# Patient Record
Sex: FEMALE | Race: WHITE | NOT HISPANIC OR LATINO | Employment: OTHER | URBAN - METROPOLITAN AREA
[De-identification: names, ages, dates, MRNs, and addresses within clinical notes are randomized per-mention and may not be internally consistent; named-entity substitution may affect disease eponyms.]

---

## 2019-04-23 ENCOUNTER — TRANSFERRED RECORDS (OUTPATIENT)
Dept: HEALTH INFORMATION MANAGEMENT | Facility: CLINIC | Age: 54
End: 2019-04-23

## 2019-04-23 LAB
ALT SERPL-CCNC: 17 U/L (ref 0–55)
AST SERPL-CCNC: 19 U/L (ref 10–40)
CREAT SERPL-MCNC: 0.84 MG/DL (ref 0.55–1.02)
GFR SERPL CREATININE-BSD FRML MDRD: >60 ML/MIN/1.73M2
GLUCOSE SERPL-MCNC: 97 MG/DL (ref 70–100)
POTASSIUM SERPL-SCNC: 4.4 MMOL/L (ref 3.5–5.1)

## 2019-06-10 ENCOUNTER — TRANSFERRED RECORDS (OUTPATIENT)
Dept: HEALTH INFORMATION MANAGEMENT | Facility: CLINIC | Age: 54
End: 2019-06-10

## 2019-06-18 ENCOUNTER — OFFICE VISIT (OUTPATIENT)
Dept: SURGERY | Facility: CLINIC | Age: 54
End: 2019-06-18
Payer: COMMERCIAL

## 2019-06-18 VITALS
HEIGHT: 63 IN | BODY MASS INDEX: 20.73 KG/M2 | SYSTOLIC BLOOD PRESSURE: 120 MMHG | HEART RATE: 60 BPM | DIASTOLIC BLOOD PRESSURE: 80 MMHG | WEIGHT: 117 LBS

## 2019-06-18 DIAGNOSIS — K80.20 SYMPTOMATIC CHOLELITHIASIS: ICD-10-CM

## 2019-06-18 PROCEDURE — 99204 OFFICE O/P NEW MOD 45 MIN: CPT | Performed by: SURGERY

## 2019-06-18 ASSESSMENT — MIFFLIN-ST. JEOR: SCORE: 1089.84

## 2019-06-19 PROBLEM — K80.20 SYMPTOMATIC CHOLELITHIASIS: Status: ACTIVE | Noted: 2019-06-19

## 2019-06-19 NOTE — PROGRESS NOTES
"Surgery Consultation, [facility]         Richie Wilcox MD    Shaneka Cummings MRN# [id number]   YOB: 1965 Age: 54 year old     PCP:  Justine Espino [phone]    Chief Complaint:  Right upper quadrant pain, nausea    History of Present Illness:  Shaneka Cummings is a 54 year old female who presented with several episodes of upper abdominal pain and intermittent nausea, usually after eating.  Commonly associated with eating greasy foods.  Episodes last a particularly long time, usually days at a time.  Pain is fairly severe.  Pain is exacerbated with eating rich foods but can also be more random.  Sometimes occurs in the middle of the night.  Some nausea without emesis.  No history of jaundice or darkened urine.  Patient was seen at an outside facility for the symptoms.  Right upper quadrant ultrasound showed gallstones and a probable gallbladder polyp.  Labs were within normal limits.  The patient is now here to discuss diagnosis and management options.    PMH:  Shaneka Cummings  has a past medical history of Diverticulosis.  PSH:  Shaneka Cummings  has a past surgical history that includes GYN surgery.    Home medications and allergies reviewed.    Social History:  Shaneka Cummings  reports that she has never smoked. She has never used smokeless tobacco. She reports that she drinks alcohol. She reports that she does not use drugs.  Family History:  Shaneka Cummings family history includes Cerebrovascular Disease in her paternal grandfather; Coronary Artery Disease in her father; Hyperlipidemia in her father, mother, and sister; Hypertension in her father and mother; Lung Cancer in her maternal grandfather; Thyroid Disease in her father.    ROS:  The 10 point Review of Systems is negative other than noted in the HPI.  No fevers or chills.  No recent weight loss or weight gain..    Physical Exam:  Blood pressure 120/80, pulse 60, height 1.6 m (5' 3\"), weight 53.1 kg (117 " lb).  117 lbs 0 oz  Thin healthy female in no distress.  Patient has a pleasant affect, speaks without difficulty.   Pupils equal round and reactive to light.   No cervical lymphadenopathy or thyromegaly.   Lung fields clear, breathing comfortably.   Heart normal sinus rhythm.  No murmurs rubs or gallops.  Abdomen soft, nontender, nondistended.  Minimal tenderness in the right upper quadrant, no masses appreciated. No peritoneal signs or rebound.  Skin warm, dry, without rashes or lesions.    All new lab and imaging data was reviewed.  Abdominal ultrasound shows gallbladder with stones and a polyp.  No wall thickening or pericholecystic fluid.     Assessment/plan:  Shaneka Cummings is a 54 year old female with signs and symptoms suggesting symptomatic cholelithiasis. I've recommended laparoscopic cholecystectomy.  The patient understands that, given her somewhat atypical symptoms and duration of pain, there is a chance that surgery may not completely eradicate her symptoms.  Surgical comorbidities include hypothyroidism.  I feel the patient is a good candidate for the surgery and that this should be able to be done as an outpatient. They were going to consider their options and likely schedule surgery.    Messi Wilcox M.D.  [facility]  [phone]    Please route or send letter to:  Primary Care Provider (PCP) and Referring Provider

## 2019-10-01 ENCOUNTER — OFFICE VISIT (OUTPATIENT)
Dept: URGENT CARE | Facility: URGENT CARE | Age: 54
End: 2019-10-01
Payer: COMMERCIAL

## 2019-10-01 VITALS
SYSTOLIC BLOOD PRESSURE: 137 MMHG | HEART RATE: 89 BPM | WEIGHT: 117 LBS | BODY MASS INDEX: 20.73 KG/M2 | TEMPERATURE: 97.3 F | OXYGEN SATURATION: 100 % | DIASTOLIC BLOOD PRESSURE: 78 MMHG

## 2019-10-01 DIAGNOSIS — N30.00 ACUTE CYSTITIS WITHOUT HEMATURIA: Primary | ICD-10-CM

## 2019-10-01 LAB
ALBUMIN UR-MCNC: NEGATIVE MG/DL
APPEARANCE UR: CLEAR
BILIRUB UR QL STRIP: NEGATIVE
COLOR UR AUTO: YELLOW
GLUCOSE UR STRIP-MCNC: NEGATIVE MG/DL
HGB UR QL STRIP: ABNORMAL
KETONES UR STRIP-MCNC: NEGATIVE MG/DL
LEUKOCYTE ESTERASE UR QL STRIP: ABNORMAL
NITRATE UR QL: NEGATIVE
PH UR STRIP: 7 PH (ref 5–7)
RBC #/AREA URNS AUTO: ABNORMAL /HPF
SOURCE: ABNORMAL
SP GR UR STRIP: <=1.005 (ref 1–1.03)
UROBILINOGEN UR STRIP-ACNC: 0.2 EU/DL (ref 0.2–1)
WBC #/AREA URNS AUTO: ABNORMAL /HPF

## 2019-10-01 PROCEDURE — 87086 URINE CULTURE/COLONY COUNT: CPT | Performed by: PHYSICIAN ASSISTANT

## 2019-10-01 PROCEDURE — 99203 OFFICE O/P NEW LOW 30 MIN: CPT | Performed by: PHYSICIAN ASSISTANT

## 2019-10-01 PROCEDURE — 81001 URINALYSIS AUTO W/SCOPE: CPT | Performed by: PHYSICIAN ASSISTANT

## 2019-10-01 RX ORDER — SULFAMETHOXAZOLE/TRIMETHOPRIM 800-160 MG
1 TABLET ORAL 2 TIMES DAILY
Qty: 10 TABLET | Refills: 0 | Status: SHIPPED | OUTPATIENT
Start: 2019-10-01 | End: 2019-10-06

## 2019-10-02 LAB
BACTERIA SPEC CULT: NO GROWTH
SPECIMEN SOURCE: NORMAL

## 2019-10-02 ASSESSMENT — ENCOUNTER SYMPTOMS
SHORTNESS OF BREATH: 0
FLANK PAIN: 0
CHILLS: 0
NAUSEA: 0
DYSURIA: 1
HEMATURIA: 1
FREQUENCY: 0
VOMITING: 0
DIARRHEA: 0
ABDOMINAL PAIN: 0
FEVER: 0
MYALGIAS: 0

## 2019-10-02 NOTE — PROGRESS NOTES
HPI    2019    HPI: Shaneka Cummings is a 54 year old female who complains of dysuria onset 3 days ago. Pt has a history of frequent UTIs, hasn't had one in a few months. She believes she had mild hematuria x 1 yesterday AM. No treatments tried. No known alleviating or aggravating factors. Symptoms are moderate in severity & constant in duration. Denies vaginal discharge, urinary frequency, genital sores, abd pain, flank pain, N/V/D, and any other symptoms.    Past Medical History:   Diagnosis Date     Diverticulosis      Hyperlipidemia      Postablative hypothyroidism      Past Surgical History:   Procedure Laterality Date     GYN SURGERY       x2     Social History     Tobacco Use     Smoking status: Never Smoker     Smokeless tobacco: Never Used   Substance Use Topics     Alcohol use: Yes     Drug use: No       Problem list, Medication list, Allergies, and Medical/Social/Surgical histories reviewed in [facility] and updated as appropriate.    Review of Systems   Constitutional: Negative for chills and fever.   Respiratory: Negative for shortness of breath.    Cardiovascular: Negative for chest pain.   Gastrointestinal: Negative for abdominal pain, diarrhea, nausea and vomiting.   Genitourinary: Positive for dysuria and hematuria. Negative for flank pain and frequency.   Musculoskeletal: Negative for myalgias.   All other systems reviewed and are negative.      Physical Exam  Vitals signs and nursing note reviewed.   Constitutional:       Appearance: Normal appearance.   Cardiovascular:      Rate and Rhythm: Normal rate and regular rhythm.   Pulmonary:      Effort: Pulmonary effort is normal.      Breath sounds: Normal breath sounds.   Abdominal:      Palpations: Abdomen is soft.      Tenderness: There is no tenderness. There is no right CVA tenderness or left CVA tenderness.   Skin:     General: Skin is warm and dry.   Neurological:      Mental Status: She is oriented to person, place, and time.          Vital Signs  /78   Pulse 89   Temp 97.3  F (36.3  C) (Oral)   Wt 53.1 kg (117 lb)   SpO2 100%   BMI 20.73 kg/m       Diagnostic Test Results:  Results for orders placed or performed in visit on 10/01/19 (from the past 24 hour(s))   *UA reflex to Microscopic and Culture ([place] and [facility] (except [place] and [place])   Result Value Ref Range    Color Urine Yellow     Appearance Urine Clear     Glucose Urine Negative NEG^Negative mg/dL    Bilirubin Urine Negative NEG^Negative    Ketones Urine Negative NEG^Negative mg/dL    Specific Gravity Urine <=1.005 1.003 - 1.035    Blood Urine Moderate (A) NEG^Negative    pH Urine 7.0 5.0 - 7.0 pH    Protein Albumin Urine Negative NEG^Negative mg/dL    Urobilinogen Urine 0.2 0.2 - 1.0 EU/dL    Nitrite Urine Negative NEG^Negative    Leukocyte Esterase Urine Large (A) NEG^Negative    Source Midstream Urine    Urine Microscopic   Result Value Ref Range    WBC Urine 5-10 (A) OTO5^0 - 5 /HPF    RBC Urine O - 2 OTO2^O - 2 /HPF       ASSESSMENT/PLAN      ICD-10-CM    1. Acute cystitis without hematuria N30.00 *UA reflex to Microscopic and Culture ([place] and [facility] (except [place] and [place])     Urine Culture Aerobic Bacterial     Urine Microscopic     sulfamethoxazole-trimethoprim (BACTRIM DS) 800-160 MG tablet      UA c/w UTI, no s/sx pyelonephritis. Rx Bactrim. Culture pending.      I have discussed any lab or imaging results, the patient's diagnosis, and my plan of treatment with the patient and/or family. Patient is aware to come back in if with worsening symptoms or if no relief despite treatment plan.  Patient voiced understanding and had no further questions.       Follow Up: Return in about 3 days (around 10/4/2019) for Follow up w/ primary care provider if not better.    YOLANDE Kline, PAAdeolaC  [facility]

## 2024-10-12 ENCOUNTER — APPOINTMENT (OUTPATIENT)
Dept: ULTRASOUND IMAGING | Facility: CLINIC | Age: 59
End: 2024-10-12
Attending: EMERGENCY MEDICINE
Payer: COMMERCIAL

## 2024-10-12 ENCOUNTER — HOSPITAL ENCOUNTER (EMERGENCY)
Facility: CLINIC | Age: 59
Discharge: HOME | End: 2024-10-12
Attending: EMERGENCY MEDICINE | Admitting: EMERGENCY MEDICINE
Payer: COMMERCIAL

## 2024-10-12 VITALS
TEMPERATURE: 98 F | WEIGHT: 110 LBS | HEIGHT: 64 IN | RESPIRATION RATE: 18 BRPM | DIASTOLIC BLOOD PRESSURE: 45 MMHG | HEART RATE: 50 BPM | OXYGEN SATURATION: 100 % | SYSTOLIC BLOOD PRESSURE: 111 MMHG | BODY MASS INDEX: 18.78 KG/M2

## 2024-10-12 DIAGNOSIS — K80.20 SYMPTOMATIC CHOLELITHIASIS: ICD-10-CM

## 2024-10-12 LAB
ALBUMIN SERPL BCG-MCNC: 4.4 G/DL (ref 3.5–5.2)
ALP SERPL-CCNC: 83 U/L (ref 40–150)
ALT SERPL W P-5'-P-CCNC: 42 U/L (ref 0–50)
ANION GAP SERPL CALCULATED.3IONS-SCNC: 9 MMOL/L (ref 7–15)
AST SERPL W P-5'-P-CCNC: 36 U/L (ref 0–45)
BASOPHILS # BLD AUTO: 0.1 10E3/UL (ref 0–0.2)
BASOPHILS NFR BLD AUTO: 1 %
BILIRUB SERPL-MCNC: 0.2 MG/DL
BUN SERPL-MCNC: 14.5 MG/DL (ref 8–23)
CALCIUM SERPL-MCNC: 9.2 MG/DL (ref 8.8–10.4)
CHLORIDE SERPL-SCNC: 104 MMOL/L (ref 98–107)
CREAT SERPL-MCNC: 0.81 MG/DL (ref 0.51–0.95)
EGFRCR SERPLBLD CKD-EPI 2021: 82 ML/MIN/1.73M2
EOSINOPHIL # BLD AUTO: 0 10E3/UL (ref 0–0.7)
EOSINOPHIL NFR BLD AUTO: 0 %
ERYTHROCYTE [DISTWIDTH] IN BLOOD BY AUTOMATED COUNT: 12.1 % (ref 10–15)
GLUCOSE SERPL-MCNC: 92 MG/DL (ref 70–99)
HCO3 SERPL-SCNC: 28 MMOL/L (ref 22–29)
HCT VFR BLD AUTO: 39.8 % (ref 35–47)
HGB BLD-MCNC: 12.9 G/DL (ref 11.7–15.7)
IMM GRANULOCYTES # BLD: 0 10E3/UL
IMM GRANULOCYTES NFR BLD: 0 %
LIPASE SERPL-CCNC: 45 U/L (ref 13–60)
LYMPHOCYTES # BLD AUTO: 1.3 10E3/UL (ref 0.8–5.3)
LYMPHOCYTES NFR BLD AUTO: 22 %
MCH RBC QN AUTO: 30.1 PG (ref 26.5–33)
MCHC RBC AUTO-ENTMCNC: 32.4 G/DL (ref 31.5–36.5)
MCV RBC AUTO: 93 FL (ref 78–100)
MONOCYTES # BLD AUTO: 0.4 10E3/UL (ref 0–1.3)
MONOCYTES NFR BLD AUTO: 6 %
NEUTROPHILS # BLD AUTO: 4 10E3/UL (ref 1.6–8.3)
NEUTROPHILS NFR BLD AUTO: 71 %
NRBC # BLD AUTO: 0 10E3/UL
NRBC BLD AUTO-RTO: 0 /100
PLATELET # BLD AUTO: 229 10E3/UL (ref 150–450)
POTASSIUM SERPL-SCNC: 4 MMOL/L (ref 3.4–5.3)
PROT SERPL-MCNC: 6.6 G/DL (ref 6.4–8.3)
RBC # BLD AUTO: 4.28 10E6/UL (ref 3.8–5.2)
SODIUM SERPL-SCNC: 141 MMOL/L (ref 135–145)
WBC # BLD AUTO: 5.6 10E3/UL (ref 4–11)

## 2024-10-12 PROCEDURE — 99284 EMERGENCY DEPT VISIT MOD MDM: CPT | Mod: 25

## 2024-10-12 PROCEDURE — 76705 ECHO EXAM OF ABDOMEN: CPT

## 2024-10-12 PROCEDURE — 36415 COLL VENOUS BLD VENIPUNCTURE: CPT | Performed by: EMERGENCY MEDICINE

## 2024-10-12 PROCEDURE — 85025 COMPLETE CBC W/AUTO DIFF WBC: CPT | Performed by: EMERGENCY MEDICINE

## 2024-10-12 PROCEDURE — 83690 ASSAY OF LIPASE: CPT | Performed by: EMERGENCY MEDICINE

## 2024-10-12 PROCEDURE — 80053 COMPREHEN METABOLIC PANEL: CPT | Performed by: EMERGENCY MEDICINE

## 2024-10-12 ASSESSMENT — ACTIVITIES OF DAILY LIVING (ADL)
ADLS_ACUITY_SCORE: 35
ADLS_ACUITY_SCORE: 35

## 2024-10-12 ASSESSMENT — COLUMBIA-SUICIDE SEVERITY RATING SCALE - C-SSRS
2. HAVE YOU ACTUALLY HAD ANY THOUGHTS OF KILLING YOURSELF IN THE PAST MONTH?: NO
1. IN THE PAST MONTH, HAVE YOU WISHED YOU WERE DEAD OR WISHED YOU COULD GO TO SLEEP AND NOT WAKE UP?: NO
6. HAVE YOU EVER DONE ANYTHING, STARTED TO DO ANYTHING, OR PREPARED TO DO ANYTHING TO END YOUR LIFE?: NO

## 2024-10-12 NOTE — ED TRIAGE NOTES
Patient was seen in ED end of September and was told she will need gall bladder surgery consult. Since abdomen pain is ongoing with nausea, having difficulty to keep things down. Was told to come back to ED to speed up the process.      Triage Assessment (Adult)       Row Name 10/12/24 0719          Triage Assessment    Airway WDL WDL        Respiratory WDL    Respiratory WDL WDL        Skin Circulation/Temperature WDL    Skin Circulation/Temperature WDL WDL        Cardiac WDL    Cardiac WDL WDL        Peripheral/Neurovascular WDL    Peripheral Neurovascular WDL WDL        Cognitive/Neuro/Behavioral WDL    Cognitive/Neuro/Behavioral WDL WDL

## 2024-10-12 NOTE — ED PROVIDER NOTES
"  Emergency Department Note      History of Present Illness     Chief Complaint   Abdominal Pain and Nausea      HPI   Shaneka Cummings is a 59 year old female with a history of cholelithiasis, hypothyroidism, HLD, and splenic artery aneurysm who presents to the ED for the evaluation of abdominal pain and nausea. The patient reports upper abdominal pain and pressure as well as nausea. She had an US at  on 09/30 for abdominal pain and vomiting but was told the US was negative. She was told she had a gallbladder issue in September of 2022 but has managed with diet. She was referred to ED yesterday by a nurse since the abdominal pain moved to the left upper quadrant, though she has a consult in 10 days. Denies vomiting.    Independent Historian   None    Review of External Notes   Clinic visit reviewed from 9-    Past Medical History     Medical History and Problem List   Diverticulosis  HLD  Hypothyroidism  Cholelithiasis  Splenic artery aneurysm    Medications   nifedipine     Surgical History   C section  L cleft earlobe repair    Physical Exam     Patient Vitals for the past 24 hrs:   BP Temp Temp src Pulse Resp SpO2 Height Weight   10/12/24 0718 111/45 98  F (36.7  C) Temporal 50 18 100 % 1.626 m (5' 4\") 49.9 kg (110 lb)     Physical Exam  Gen: well appearing, in no acute distress  Oral : Mucous membranes moist,   Nose: No rhinorhea  Ears: External near normal, without drainage  Eyes: periorbital tissues and sclera normal   Neck: supple, no abnormal swelling  Lungs: Clear bilaterally, no tachypnea or distress, speaks full sentences  CV: Regular rate, regular rhythm  Abd: soft, nontender, nondistended, no rebound/guarding  Ext: no lower extremity edema  Skin: warm, dry, well perfused, no rashes/bruising/lesions on exposed skin  Neuro: alert, no gross motor or sensory deficits,   Psych: pleasant mood, normal affect    Diagnostics     Lab Results   Labs Ordered and Resulted from Time of ED Arrival to Time of " ED Departure   COMPREHENSIVE METABOLIC PANEL - Normal       Result Value    Sodium 141      Potassium 4.0      Carbon Dioxide (CO2) 28      Anion Gap 9      Urea Nitrogen 14.5      Creatinine 0.81      GFR Estimate 82      Calcium 9.2      Chloride 104      Glucose 92      Alkaline Phosphatase 83      AST 36      ALT 42      Protein Total 6.6      Albumin 4.4      Bilirubin Total 0.2     LIPASE - Normal    Lipase 45     CBC WITH PLATELETS AND DIFFERENTIAL    WBC Count 5.6      RBC Count 4.28      Hemoglobin 12.9      Hematocrit 39.8      MCV 93      MCH 30.1      MCHC 32.4      RDW 12.1      Platelet Count 229      % Neutrophils 71      % Lymphocytes 22      % Monocytes 6      % Eosinophils 0      % Basophils 1      % Immature Granulocytes 0      NRBCs per 100 WBC 0      Absolute Neutrophils 4.0      Absolute Lymphocytes 1.3      Absolute Monocytes 0.4      Absolute Eosinophils 0.0      Absolute Basophils 0.1      Absolute Immature Granulocytes 0.0      Absolute NRBCs 0.0         Imaging   US Abdomen Limited   Final Result   IMPRESSION:   Cholelithiasis, but no acute inflammation or biliary dilation.             EKG       Independent Interpretation   None    ED Course      Medications Administered   Medications - No data to display    Procedures   Procedures     Discussion of Management   None    ED Course   ED Course as of 10/12/24 0928   Sat Oct 12, 2024   0742 I evaluated the patient and obtained history.    0920 I rechecked and updated the patient.        Additional Documentation  None    Medical Decision Making / Diagnosis     CMS Diagnoses: None    MIPS       None    [facility]   Shaneka Cummings is a 59 year old female presents the emergency department for persistent nausea and occasional pain with eating history of cholelithiasis previously diagnosed.  She is currently waiting to be seen in the surgical clinic at [facility], here today for a recheck given persistent symptoms.  She is not vomiting she is not  having intense pain at the moment.  She gets uncomfortable at times when she eats and has kind of a low level nausea.  Her abdominal exam is not consistent with surgical abdomen.  Ultrasound negative for acute cholecystitis or biliary obstruction, labs are within normal limits.  Had a good discussion with the patient she is interested in having a second opinion with the [facility] surgical teams I will place a consult for that.  We discussed return precautions and I do feel she can be safely discharged home.    Disposition   The patient was discharged.     Diagnosis     ICD-10-CM    1. Symptomatic cholelithiasis  K80.20 Adult Gen Surg  Referral           Discharge Medications   New Prescriptions    No medications on file         Scribe Disclosure:  IAshley, am serving as a scribe at 7:46 AM on 10/12/2024 to document services personally performed by Gurdeep Miranda MD, based on my observations and the provider's statements to me.        Gurdeep Miranda MD  10/12/24 0915

## 2025-02-09 ENCOUNTER — HEALTH MAINTENANCE LETTER (OUTPATIENT)
Age: 60
End: 2025-02-09